# Patient Record
Sex: FEMALE | Race: WHITE | NOT HISPANIC OR LATINO | Employment: FULL TIME | URBAN - METROPOLITAN AREA
[De-identification: names, ages, dates, MRNs, and addresses within clinical notes are randomized per-mention and may not be internally consistent; named-entity substitution may affect disease eponyms.]

---

## 2024-07-11 ENCOUNTER — OFFICE VISIT (OUTPATIENT)
Dept: DERMATOLOGY | Facility: CLINIC | Age: 24
End: 2024-07-11

## 2024-07-11 DIAGNOSIS — M62.89: Primary | ICD-10-CM

## 2024-07-11 PROCEDURE — 99207 PR NO CHARGE LOS: CPT | Mod: GC | Performed by: DERMATOLOGY

## 2024-07-11 NOTE — LETTER
7/11/2024      Clover Cantu  [address] [facility] [address]      Dear Colleague,    Thank you for referring your patient, Clover Cantu, to the [facility]. Please see a copy of my visit note below.    Botulinum Injection Procedure Note:  Cosmetic      Dermatology Problem List:  Masseter Hypertrophy    SURGEON (S): Dr. Grossman     NURSE: YOLIE Mayers     ANESTHESIA:   None    PREOPERATIVE DIAGNOSIS:   Masseter hypertrophy    LOCATION: bilateral masseters    LOT NO: [id number]    EXP DATE: 11/30/2025      OPERATION/PROCEDURE:   Intralesional botulinum toxin injection     Dilution with 3 mL preserved sterile normal saline in a 300 Unit Dysport Vial.    Total units of botulinum toxin: 100     POSTOPERATIVE DIAGNOSIS:   SAME     PREPARATION:   Alcohol swab    DESCRIPTION OF OPERATION/PROCEDURE:   The nature and purpose of the procedure, associated risks including but not limited to bruising, headache or discomfort at the site(s), numbness, muscle twitching, brow or eyelid droop, headache, double vision, not enough effect or too much effect, difficulty whistling or drinking from a straw, loss of muscle tone, or infection. Possible consequences and complications, and alternative methods of treatment were explained in detail. The patient declined a personal or family history of neuromuscular disease prior to the procedure. The patient is not pregnant or breast feeding.    A signed informed operative consent was obtained.    The patient was taken to the operative suite and properly positioned. The area to be treated was defined and confirmed by the patient and physician. The area for Botox injection was marked.    Cosmetic procedure: Injections were performed. The patient tolerated the procedure well and there were no complications noted.         Clinical Follow-Up: PRN     Staff Involved:  Resident/Staff   Tavon Edwards MD  Dermatology Resident, PGY2     Staff Physician  Comments:   I saw and evaluated the patient with the resident and I agree with the assessment and plan. I was present for the entire minor procedure and examination.    No charge. Patient volunteered for resident cosmetic training. Donated products were used.      Jacky Grossman DO    Department of Dermatology  [facility]: Phone: [phone], Fax:[phone]  [facility]: Phone: [phone], Fax: [phone]        Again, thank you for allowing me to participate in the care of your patient.        Sincerely,        Jacky Grossman MD

## 2024-07-11 NOTE — PROGRESS NOTES
Botulinum Injection Procedure Note:  Cosmetic      Dermatology Problem List:  Masseter Hypertrophy    SURGEON (S): Dr. Grossman     NURSE: YOLIE Mayers     ANESTHESIA:   None    PREOPERATIVE DIAGNOSIS:   Masseter hypertrophy    LOCATION: bilateral masseters    LOT NO: [id number]    EXP DATE: 11/30/2025      OPERATION/PROCEDURE:   Intralesional botulinum toxin injection     Dilution with 3 mL preserved sterile normal saline in a 300 Unit Dysport Vial.    Total units of botulinum toxin: 100     POSTOPERATIVE DIAGNOSIS:   SAME     PREPARATION:   Alcohol swab    DESCRIPTION OF OPERATION/PROCEDURE:   The nature and purpose of the procedure, associated risks including but not limited to bruising, headache or discomfort at the site(s), numbness, muscle twitching, brow or eyelid droop, headache, double vision, not enough effect or too much effect, difficulty whistling or drinking from a straw, loss of muscle tone, or infection. Possible consequences and complications, and alternative methods of treatment were explained in detail. The patient declined a personal or family history of neuromuscular disease prior to the procedure. The patient is not pregnant or breast feeding.    A signed informed operative consent was obtained.    The patient was taken to the operative suite and properly positioned. The area to be treated was defined and confirmed by the patient and physician. The area for Botox injection was marked.    Cosmetic procedure: Injections were performed. The patient tolerated the procedure well and there were no complications noted.         Clinical Follow-Up: PRN     Staff Involved:  Resident/Staff   Tavon Edwards MD  Dermatology Resident, PGY2     Staff Physician Comments:   I saw and evaluated the patient with the resident and I agree with the assessment and plan. I was present for the entire minor procedure and examination.    No charge. Patient volunteered for resident cosmetic training. Donated products were  used.      Jacky Grossman DO    Department of Dermatology  [facility]: Phone: [phone], Fax:[phone]  [facility]: Phone: [phone], Fax: [phone]

## 2024-07-11 NOTE — NURSING NOTE
Clover Cantu's goals for this visit include:   Chief Complaint   Patient presents with    Botox     Patient is here for resident training no charge       She requests these members of her care team be copied on today's visit information:     PCP: No Ref-Primary, Physician    Referring Provider:  Referred Self, MD  No address on file    There were no vitals taken for this visit.    Do you need any medication refills at today's visit?       Riana Velasquez EMT